# Patient Record
Sex: MALE | Race: WHITE | HISPANIC OR LATINO | URBAN - METROPOLITAN AREA
[De-identification: names, ages, dates, MRNs, and addresses within clinical notes are randomized per-mention and may not be internally consistent; named-entity substitution may affect disease eponyms.]

---

## 2023-02-28 ENCOUNTER — OFFICE (OUTPATIENT)
Dept: URBAN - METROPOLITAN AREA CLINIC 34 | Facility: CLINIC | Age: 28
End: 2023-02-28
Payer: MEDICAID

## 2023-02-28 VITALS
DIASTOLIC BLOOD PRESSURE: 76 MMHG | HEIGHT: 70 IN | SYSTOLIC BLOOD PRESSURE: 124 MMHG | TEMPERATURE: 98.2 F | HEART RATE: 85 BPM | WEIGHT: 222 LBS

## 2023-02-28 DIAGNOSIS — K21.9 GASTRO-ESOPHAGEAL REFLUX DISEASE WITHOUT ESOPHAGITIS: ICD-10-CM

## 2023-02-28 DIAGNOSIS — R19.4 CHANGE IN BOWEL HABIT: ICD-10-CM

## 2023-02-28 DIAGNOSIS — R13.10 DYSPHAGIA, UNSPECIFIED: ICD-10-CM

## 2023-02-28 DIAGNOSIS — K62.5 HEMORRHAGE OF ANUS AND RECTUM: ICD-10-CM

## 2023-02-28 PROCEDURE — 99205 OFFICE O/P NEW HI 60 MIN: CPT

## 2023-02-28 NOTE — SERVICEHPINOTES
26 y/o male c/o acid reflux. Patient reports this has been a problem for the last 6 yrs. He had just moved to [place] when sx started, therefore he thought the heartburn was just due to the new/different foods he was eating. Symptoms progressed to acid regurgitation, sore throat, burning at base of the tongue. States he became concerned about 1.5 yrs ago when he developed difficulty swallowing solids and even liquids. He was seen in the ER then followed up with GI was advised EGD but he could not complete. Took pantoprazole for about 1 mo which he says "did not help much" though the dysphagia improved. [place] a couple of months ago and has been trying to change his diet. Despite changes he is having significant daytime sx and frequently wakes up in the middle of the night "gasping for air due to acid". He is still having intermittent issues swallowing solids, though not as sever as before. There is some epigastric discomfort "like bloating but would not call it pain". No nausea or vomiting. Notes fairly regular etoh use x2-3yrs, 3-4 drinks/week much less since moving back. No tobacco or NSAIDS. Reports father had h. pylori ulcers 
br
br His bowel habits have also been "off" recently. Generally has 1 BM/day type 2-3 BSS, but has "one liquid stool" at least once a week. No diet triggers, random. No assoc pain, only urgency. Also endorses small amounts of brb on wipe has not paid attention to correlation with stool form. No fevers, chills, rashes, joint pain, unintentional weight loss, No fam hx IBD or CRC.br

## 2023-03-01 ENCOUNTER — NEW PATIENT (OUTPATIENT)
Dept: URBAN - METROPOLITAN AREA CLINIC 58 | Facility: CLINIC | Age: 28
End: 2023-03-01

## 2023-03-01 DIAGNOSIS — H33.323: ICD-10-CM

## 2023-03-01 DIAGNOSIS — H31.321: ICD-10-CM

## 2023-03-01 PROCEDURE — 92201 OPSCPY EXTND RTA DRAW UNI/BI: CPT

## 2023-03-01 PROCEDURE — 99204 OFFICE O/P NEW MOD 45 MIN: CPT

## 2023-03-01 PROCEDURE — 92134 CPTRZ OPH DX IMG PST SGM RTA: CPT

## 2023-03-01 ASSESSMENT — TONOMETRY
OD_IOP_MMHG: 18
OS_IOP_MMHG: 18

## 2023-03-01 ASSESSMENT — VISUAL ACUITY
OD_SC: 20/30-2
OD_PH: 20/25-1
OS_SC: 20/25-1

## 2023-03-09 ENCOUNTER — OFFICE (OUTPATIENT)
Dept: URBAN - METROPOLITAN AREA CLINIC 30 | Facility: CLINIC | Age: 28
End: 2023-03-09
Payer: MEDICAID

## 2023-03-09 VITALS
TEMPERATURE: 97.8 F | SYSTOLIC BLOOD PRESSURE: 125 MMHG | SYSTOLIC BLOOD PRESSURE: 114 MMHG | RESPIRATION RATE: 16 BRPM | TEMPERATURE: 98.8 F | DIASTOLIC BLOOD PRESSURE: 76 MMHG | HEART RATE: 74 BPM | HEIGHT: 70 IN | SYSTOLIC BLOOD PRESSURE: 104 MMHG | HEART RATE: 78 BPM | WEIGHT: 215 LBS | SYSTOLIC BLOOD PRESSURE: 113 MMHG | DIASTOLIC BLOOD PRESSURE: 60 MMHG | DIASTOLIC BLOOD PRESSURE: 82 MMHG | OXYGEN SATURATION: 97 % | OXYGEN SATURATION: 96 % | OXYGEN SATURATION: 95 % | DIASTOLIC BLOOD PRESSURE: 67 MMHG | RESPIRATION RATE: 17 BRPM | DIASTOLIC BLOOD PRESSURE: 74 MMHG | OXYGEN SATURATION: 98 % | HEART RATE: 97 BPM | SYSTOLIC BLOOD PRESSURE: 117 MMHG | HEART RATE: 93 BPM

## 2023-03-09 DIAGNOSIS — K31.A0 GASTRIC INTESTINAL METAPLASIA, UNSPECIFIED: ICD-10-CM

## 2023-03-09 DIAGNOSIS — K31.7 POLYP OF STOMACH AND DUODENUM: ICD-10-CM

## 2023-03-09 DIAGNOSIS — K22.89 OTHER SPECIFIED DISEASE OF ESOPHAGUS: ICD-10-CM

## 2023-03-09 DIAGNOSIS — R13.10 DYSPHAGIA, UNSPECIFIED: ICD-10-CM

## 2023-03-09 DIAGNOSIS — K21.9 GASTRO-ESOPHAGEAL REFLUX DISEASE WITHOUT ESOPHAGITIS: ICD-10-CM

## 2023-03-09 RX ORDER — ESOMEPRAZOLE MAGNESIUM 40 MG/1
CAPSULE, DELAYED RELEASE ORAL
Qty: 30 | Refills: 5 | Status: ACTIVE
Start: 2023-03-09

## 2023-03-09 NOTE — SERVICENOTES
Pending pathology and response to PPI, can consider additional workup such as barium swallow and/or manometry.

## 2023-04-13 ENCOUNTER — TELEHEALTH PROVIDED IN PATIENT'S HOME (OUTPATIENT)
Dept: URBAN - METROPOLITAN AREA TELEHEALTH 12 | Facility: TELEHEALTH | Age: 28
End: 2023-04-13
Payer: MEDICAID

## 2023-04-13 VITALS — HEIGHT: 70 IN | WEIGHT: 215 LBS

## 2023-04-13 DIAGNOSIS — K21.9 GASTRO-ESOPHAGEAL REFLUX DISEASE WITHOUT ESOPHAGITIS: ICD-10-CM

## 2023-04-13 DIAGNOSIS — R13.10 DYSPHAGIA, UNSPECIFIED: ICD-10-CM

## 2023-04-13 DIAGNOSIS — R19.4 CHANGE IN BOWEL HABIT: ICD-10-CM

## 2023-04-13 PROCEDURE — 99214 OFFICE O/P EST MOD 30 MIN: CPT | Mod: 95

## 2023-04-13 RX ORDER — ESOMEPRAZOLE MAGNESIUM 40 MG/1
CAPSULE, DELAYED RELEASE ORAL
Qty: 120 | Refills: 0 | Status: ACTIVE
Start: 2023-04-13

## 2023-04-13 NOTE — SERVICEHPINOTES
PATIENT VERIFIED BY DATE OF BIRTH AND NAME. Patient has been consented for this telecommunication visit. 26 y/o male presents for follow up. 2/28/23 Initial eval for: acid reflux. Patient reports this has been a problem for the last 6 yrs. He had just moved to [place] when sx started, therefore he thought the heartburn was just due to the new/different foods he was eating. Symptoms progressed to acid regurgitation, sore throat, burning at base of the tongue. States he became concerned about 1.5 yrs ago when he developed difficulty swallowing solids and even liquids. He was seen in the ER then followed up with GI was advised EGD but he could not complete. Took pantoprazole for about 1 mo which he says "did not help much" though the dysphagia improved. [place] returned to [place] a couple of months ago and has been trying to change his diet. Despite changes he is having significant daytime sx and frequently wakes up in the middle of the night "gasping for air due to acid". He is still having intermittent issues swallowing solids, though not as sever as before. There is some epigastric discomfort "like bloating but would not call it pain". No nausea or vomiting. Notes fairly regular etoh use x2-3yrs, 3-4 drinks/week much less since moving back. No tobacco or NSAIDS. Reports father had h. pylori ulcers.
br His bowel habits have also been "off" recently. Generally has 1 BM/day type 2-3 BSS, but has "one liquid stool" at least once a week. No diet triggers, random. No assoc pain, only urgency. Also endorses small amounts of brb on wipe has not paid attention to correlation with stool form. No fevers, chills, rashes, joint pain, unintentional weight loss, No fam hx IBD or CRC. --3/9/23 EGD: Faint ring formation noted in the mid to distal esophagus (Bx focally up to 30 eosinophils/hpf). normal stomach, benign polyp in duodenum.
br br 4/13/23 Follow up: Patient reports overall improvement since last visit. Started PPI qd at time of EGD. Although daytime sx are much better,, nocturnal issues persist. States unsure if it is the reflux or sleep apnea. He was not aware of recommendation to take esomeprazole bid after Dr. Olvera reviewed path report. Increasing fiber and water intake have helped bowel movements, now less hard. No recent bleeding (he prev declined colonoscopy) but still having diarrhea about once a week.
br
br All 10 point review of systems have been reviewed as per HPI and otherwise negative.br

## (undated) RX ORDER — KETOROLAC TROMETHAMINE 5 MG/ML: 1 SOLUTION OPHTHALMIC